# Patient Record
Sex: MALE | Employment: FULL TIME | URBAN - METROPOLITAN AREA
[De-identification: names, ages, dates, MRNs, and addresses within clinical notes are randomized per-mention and may not be internally consistent; named-entity substitution may affect disease eponyms.]

---

## 2018-09-02 ENCOUNTER — HOSPITAL ENCOUNTER (EMERGENCY)
Facility: HOSPITAL | Age: 38
Discharge: HOME | End: 2018-09-02
Attending: EMERGENCY MEDICINE
Payer: COMMERCIAL

## 2018-09-02 VITALS
HEART RATE: 85 BPM | WEIGHT: 200 LBS | BODY MASS INDEX: 33.32 KG/M2 | DIASTOLIC BLOOD PRESSURE: 61 MMHG | SYSTOLIC BLOOD PRESSURE: 111 MMHG | OXYGEN SATURATION: 99 % | HEIGHT: 65 IN | TEMPERATURE: 98 F | RESPIRATION RATE: 20 BRPM

## 2018-09-02 DIAGNOSIS — R51.9 HEADACHE: Primary | ICD-10-CM

## 2018-09-02 DIAGNOSIS — R42 LIGHTHEADED: ICD-10-CM

## 2018-09-02 LAB
ANION GAP SERPL CALCULATED.3IONS-SCNC: 15 MMOL/L (ref 4–13)
BASOPHILS # BLD AUTO: 0.1 THOUSANDS/ΜL (ref 0–0.1)
BASOPHILS NFR BLD AUTO: 1 % (ref 0–2)
BUN SERPL-MCNC: 16 MG/DL (ref 7–25)
CALCIUM SERPL-MCNC: 9.8 MG/DL (ref 8.6–10.5)
CHLORIDE SERPL-SCNC: 104 MMOL/L (ref 98–107)
CO2 SERPL-SCNC: 19 MMOL/L (ref 21–31)
CREAT SERPL-MCNC: 0.76 MG/DL (ref 0.7–1.3)
EOSINOPHIL # BLD AUTO: 0 THOUSAND/ΜL (ref 0–0.61)
EOSINOPHIL NFR BLD AUTO: 0 % (ref 0–5)
ERYTHROCYTE [DISTWIDTH] IN BLOOD BY AUTOMATED COUNT: 13.3 % (ref 11.5–14.5)
GFR SERPL CREATININE-BSD FRML MDRD: 117 ML/MIN/1.73SQ M
GLUCOSE SERPL-MCNC: 120 MG/DL (ref 65–99)
HCT VFR BLD AUTO: 41.4 % (ref 36.5–49.3)
HGB BLD-MCNC: 14.2 G/DL (ref 14–18)
LYMPHOCYTES # BLD AUTO: 1.8 THOUSANDS/ΜL (ref 0.6–4.47)
LYMPHOCYTES NFR BLD AUTO: 12 % (ref 21–51)
MCH RBC QN AUTO: 30.8 PG (ref 26–34)
MCHC RBC AUTO-ENTMCNC: 34.3 G/DL (ref 31–37)
MCV RBC AUTO: 90 FL (ref 81–99)
MONOCYTES # BLD AUTO: 0.5 THOUSAND/ΜL (ref 0.17–1.22)
MONOCYTES NFR BLD AUTO: 3 % (ref 2–12)
NEUTROPHILS # BLD AUTO: 12.4 THOUSANDS/ΜL (ref 1.4–6.5)
NEUTS SEG NFR BLD AUTO: 84 % (ref 42–75)
NRBC BLD AUTO-RTO: 0 /100 WBCS
PLATELET # BLD AUTO: 314 THOUSANDS/UL (ref 149–390)
PMV BLD AUTO: 8 FL (ref 8.6–11.7)
POTASSIUM SERPL-SCNC: 3.7 MMOL/L (ref 3.5–5.5)
RBC # BLD AUTO: 4.61 MILLION/UL (ref 4.3–5.9)
SODIUM SERPL-SCNC: 138 MMOL/L (ref 134–143)
WBC # BLD AUTO: 14.8 THOUSAND/UL (ref 4.8–10.8)

## 2018-09-02 PROCEDURE — 93005 ELECTROCARDIOGRAM TRACING: CPT

## 2018-09-02 PROCEDURE — 85025 COMPLETE CBC W/AUTO DIFF WBC: CPT | Performed by: EMERGENCY MEDICINE

## 2018-09-02 PROCEDURE — 80048 BASIC METABOLIC PNL TOTAL CA: CPT | Performed by: EMERGENCY MEDICINE

## 2018-09-02 PROCEDURE — 36415 COLL VENOUS BLD VENIPUNCTURE: CPT | Performed by: EMERGENCY MEDICINE

## 2018-09-02 PROCEDURE — 99284 EMERGENCY DEPT VISIT MOD MDM: CPT

## 2018-09-02 RX ORDER — BUTALBITAL, ACETAMINOPHEN AND CAFFEINE 50; 325; 40 MG/1; MG/1; MG/1
2 TABLET ORAL ONCE
Qty: 30 TABLET | Refills: 0 | Status: SHIPPED | OUTPATIENT
Start: 2018-09-02 | End: 2018-09-02

## 2018-09-02 RX ORDER — ONDANSETRON 4 MG/1
4 TABLET, ORALLY DISINTEGRATING ORAL ONCE
Qty: 20 TABLET | Refills: 0 | Status: SHIPPED | OUTPATIENT
Start: 2018-09-02 | End: 2018-09-02

## 2018-09-02 RX ORDER — BUTALBITAL, ACETAMINOPHEN AND CAFFEINE 50; 325; 40 MG/1; MG/1; MG/1
2 TABLET ORAL ONCE
Status: COMPLETED | OUTPATIENT
Start: 2018-09-02 | End: 2018-09-02

## 2018-09-02 RX ORDER — METOCLOPRAMIDE 5 MG/1
10 TABLET ORAL ONCE
Status: DISCONTINUED | OUTPATIENT
Start: 2018-09-02 | End: 2018-09-03 | Stop reason: HOSPADM

## 2018-09-02 RX ORDER — ONDANSETRON 4 MG/1
4 TABLET, ORALLY DISINTEGRATING ORAL ONCE
Status: COMPLETED | OUTPATIENT
Start: 2018-09-02 | End: 2018-09-02

## 2018-09-02 RX ORDER — KETOROLAC TROMETHAMINE 30 MG/ML
30 INJECTION, SOLUTION INTRAMUSCULAR; INTRAVENOUS ONCE
Status: DISCONTINUED | OUTPATIENT
Start: 2018-09-02 | End: 2018-09-02

## 2018-09-02 RX ORDER — MULTIVITAMIN
1 TABLET ORAL DAILY
COMMUNITY

## 2018-09-02 RX ADMIN — BUTALBITAL, ACETAMINOPHEN AND CAFFEINE 2 TABLET: 50; 325; 40 TABLET ORAL at 19:17

## 2018-09-02 RX ADMIN — ONDANSETRON 4 MG: 4 TABLET, ORALLY DISINTEGRATING ORAL at 18:55

## 2018-09-02 NOTE — ED PROVIDER NOTES
History  Chief Complaint   Patient presents with    Headache     Pt was slid down water slide and when he got off the slide he developed a HA and N/V     49-year-old male with a reported history of thyroid disease presents to the emergency department with a generalized headache over the past 5 hours  According to the patient he was water sliding  Following completion of the slide, patient developed nausea vomiting x4 and a generalized headache  He has had no loss of consciousness are no visual deficits  He has had headaches before although not this intense  He describes no visual deficits no rhinorrhea, no cough, sore throat, or runny nose  The patient reports adequate sleeping habits no appetite disturbances in his daily activities or consume by his job as a   He reports no changes bowel or bladder habits  Prior to Admission Medications   Prescriptions Last Dose Informant Patient Reported? Taking? Multiple Vitamin (MULTIVITAMIN) tablet   Yes Yes   Sig: Take 1 tablet by mouth daily      Facility-Administered Medications: None       History reviewed  No pertinent past medical history  History reviewed  No pertinent surgical history  History reviewed  No pertinent family history  I have reviewed and agree with the history as documented  Social History   Substance Use Topics    Smoking status: Never Smoker    Smokeless tobacco: Never Used    Alcohol use No        Review of Systems   Constitutional: Negative for chills and fever  HENT: Negative for rhinorrhea and sore throat  Eyes: Negative for visual disturbance  Respiratory: Negative for cough and shortness of breath  Cardiovascular: Negative for chest pain and leg swelling  Gastrointestinal: Negative for abdominal pain, diarrhea, nausea and vomiting  Genitourinary: Negative for dysuria  Musculoskeletal: Negative for back pain and myalgias  Skin: Negative for rash     Neurological: Negative for dizziness, tremors, seizures, speech difficulty, light-headedness and headaches  Psychiatric/Behavioral: Negative for confusion  All other systems reviewed and are negative  Physical Exam  Physical Exam   Constitutional: He is oriented to person, place, and time  He appears well-developed and well-nourished  He appears distressed (ANXIOUS)  HENT:   Nose: Nose normal    Mouth/Throat: Oropharynx is clear and moist  No oropharyngeal exudate  Eyes: Conjunctivae and EOM are normal  Pupils are equal, round, and reactive to light  No scleral icterus  Neck: Normal range of motion  Neck supple  No JVD present  No tracheal deviation present  Cardiovascular: Normal rate, regular rhythm and normal heart sounds  No murmur heard  Pulmonary/Chest: Effort normal and breath sounds normal  No respiratory distress  He has no wheezes  He has no rales  Abdominal: Soft  Bowel sounds are normal  There is no tenderness  There is no guarding  Musculoskeletal: Normal range of motion  He exhibits no edema or tenderness  Neurological: He is alert and oriented to person, place, and time  No cranial nerve deficit or sensory deficit  He exhibits normal muscle tone  5/5 motor, nl sens   Skin: Skin is warm and dry  Psychiatric: He has a normal mood and affect  His behavior is normal    Nursing note and vitals reviewed        Vital Signs  ED Triage Vitals [09/02/18 1857]   Temperature Pulse Respirations Blood Pressure SpO2   98 °F (36 7 °C) 90 20 166/62 100 %      Temp Source Heart Rate Source Patient Position - Orthostatic VS BP Location FiO2 (%)   Temporal Monitor -- Left arm --      Pain Score       4           Vitals:    09/02/18 1857   BP: 166/62   Pulse: 90       Visual Acuity      ED Medications  Medications - No data to display    Diagnostic Studies  Results Reviewed     None                 No orders to display              Procedures  Procedures       Phone Contacts  ED Phone Contact    ED Course MDM  Number of Diagnoses or Management Options  Headache:   Diagnosis management comments: AT 8:00 P M  THE PATIENT'S HEADACHE HAD RESOLVED  HE REMAINED SOMEWHAT NAUSEOUS  HOWEVER HE IS HEMODYNAMICALLY AND CLINICALLY STABLE AND APPROPRIATE FOR DISCHARGE  UPON DISCHARGE AT 8:30 A  M  THE PATIENT COMPLAINED THAT HE WAS TOO WEAK AND COULD NOT WALK AND THEREFORE COULD NOT BE DISCHARGED  PATIENT APPEARS ANXIOUS HYPERVENTILATING AND UNCLEAR AS WHAT MAY BE CONTRIBUTING TO HIS PRESENT CONDITION  PATIENT WAS GAIT TESTED AND DEMONSTRATES NO PATHOLOGICAL GAIT  NEVER THE LESS PATIENT IS OFFERED LABORATORY STUDIES TO INCLUDE CBC AND A BASIC METABOLIC PANEL  AN EKG RETURNED UNREMARKABLE  AT 10:15 P M  FIFTEEN HOURS THE PATIENT'S STUDIES RETURNED ESSENTIALLY UNREMARKABLE WITH SLIGHT ELEVATION OF WHITE COUNT AT 14 8  OVERALL NO EVIDENCE OF ANY ACUTE PATHOLOGY THAT WOULD SUGGEST ADMISSION PATIENT HAS BEEN ENCOURAGED TO FOLLOW THIS UP WITH THE FAMILY DOCTOR PARTICULARLY IN LIGHT OF SOME HISTORY IN THE FAMILY IN WHICH THE PATIENT HAS ALLUDED TO AS SOMEWHAT SIMILAR TO THE WAY THAT HE FEELS NOW  HOWEVER THE PATIENT CANNOT ELABORATE NOR IS HE AWARE OF WHAT PARTICULAR FLEXION THERE OR IN THE FAMILY  WHENEVER THE CONDITION FAMILY MEMBERS HAVE NOT BEEN HOSPITALIZED NOR HAVE THERE BEEN ANY APPARENT DETERIORATION IN THERE CONDITIONS  CritCare Time    Disposition  Final diagnoses:   None     ED Disposition     None      Follow-up Information    None         Patient's Medications   Discharge Prescriptions    No medications on file     No discharge procedures on file      ED Provider  Electronically Signed by           Shaun Daniel MD  09/02/18 5182       Shaun Daniel MD  09/02/18 2450

## 2018-09-03 LAB
ATRIAL RATE: 75 BPM
P AXIS: 43 DEGREES
PR INTERVAL: 146 MS
QRS AXIS: -5 DEGREES
QRSD INTERVAL: 86 MS
QT INTERVAL: 410 MS
QTC INTERVAL: 457 MS
T WAVE AXIS: 22 DEGREES
VENTRICULAR RATE: 75 BPM

## 2018-09-03 NOTE — ED NOTES
Pt refusing to be d/c  Pt states "He's discharging me? How?  I can't walk " When asked why pt is unable to walk, pt states "I feel to weak " Dr Guzman Cisneros notified     Riley Davis RN  09/02/18 2050

## 2018-09-03 NOTE — DISCHARGE INSTRUCTIONS
????   ??????   ???? ????????????????????????????????????????????????????????????????????????????????????????????????????  ??????????  · ????? ????????????????????????????????????????????????????????????????????????????????????????????????????????    · ??? ?????????????? 1 ? 3 ???????????????????????????????????????????????????????????????????????    · ?????? ??????????????????????????????????????????????????????    · ????? ???????????????????????????? 30 ??? 2 ?????????????? 1 ? 2 ??????????????????????  ???????????????  · ?????    · ?????????????    · ?????????????????????    · ??????????????????    · ???????????  ???????????????????  · ????????    · ???????????????????    · ?????????????????    · ????????????????  ?????  · ?? ??????????????????????????????????????????????????????????????????????NSAID ????? (acetaminophen) ?????????????    · ???? ?????????????????????????????????????????????????????????????    · ?????? ?????????????????????????????  ?????  · ????? ???????????????????????????????????????????????????????????????????? 2 ???? 20 ? 30 ?????????? 15 ? 20 ???????????????????????????    · ????? ?????????????????????????????????????    · ????? ???????????????????????????????????????? 24 ?????????????????????????????????????  ???????  · ?????????????? ????????????????????????????????????????????????????????????????????????    · ????? ?????????????????????????????????????????????????????????????????????????????????????????????    · ????????????? ?????????????????????????? ?? ???????????????????????????????????????    · ??????? ???????????????????? 30 ???????????????????????    · ????????? ??????????????????????????????????????????????????????????????????  ??????????????? ??????????????????????????????????????  © 2017 [facility] [facility] Information is for End User's use only and may not be sold, redistributed or otherwise used for commercial purposes   All illustrations and images included in CareNotes® are the copyrighted property of [employer]  or [employer]  The above information is an  only  It is not intended as medical advice for individual conditions or treatments  Talk to your doctor, nurse or pharmacist before following any medical regimen to see if it is safe and effective for you  Acute Headache, Ambulatory Care   GENERAL INFORMATION:   An acute headache  is pain or discomfort that starts suddenly and gets worse quickly  The cause of an acute headache may not be known  It may be triggered by stress, fatigue, hormones, food, or trauma  Common related symptoms include the following:   · Fever    · Sinus pressure    · Loss of memory    · Nausea or vomiting    · Problems with your vision, such as watery or red eyes, loss of vision, or pain in bright light    · Stiff neck    · Tenderness of the head and neck area    · Trouble staying awake, or being less alert than usual     · Weakness or less energy  Seek immediate care for the following symptoms:   · Severe pain    · A headache that occurs after a blow to the head, a fall, or other trauma     · Confusion or forgetfulness    · Numbness on one side of your face or body  Treatment for an acute headache  may include medicine to decrease pain  You may also need biofeedback or cognitive behavioral therapy  Ask your healthcare provider about these and other treatments for an acute headache  Manage my symptoms:   · Apply heat  on your head for 20 to 30 minutes every 2 hours for as many days as directed  Heat helps decrease pain and muscle spasms  You may alternate heat and ice  · Apply ice  on your head for 15 to 20 minutes every hour or as directed  Use an ice pack, or put crushed ice in a plastic bag  Cover it with a towel  Ice helps decrease pain  · Relax your muscles  Lie down in a comfortable position and close your eyes  Relax your muscles slowly  Start at your toes and work your way up your body      · Keep a record of your headaches  Write down when your headaches start and stop  Include your symptoms and what you were doing when the headache began  Record what you ate or drank for 24 hours before the headache started  Describe the pain and where it hurts  Keep track of what you did to treat your headache and whether it worked  Follow up with your healthcare provider as directed:  Bring your headache record with you when you see your healthcare provider  Write down your questions so you remember to ask them during your visits  CARE AGREEMENT:   You have the right to help plan your care  Learn about your health condition and how it may be treated  Discuss treatment options with your caregivers to decide what care you want to receive  You always have the right to refuse treatment  The above information is an  only  It is not intended as medical advice for individual conditions or treatments  Talk to your doctor, nurse or pharmacist before following any medical regimen to see if it is safe and effective for you  © 2014 [address] is for End User's use only and may not be sold, redistributed or otherwise used for commercial purposes  All illustrations and images included in CareNotes® are the copyrighted property of A [employer] M [employer]  or [employer]

## 2018-09-03 NOTE — ED NOTES
Pt ambulated to bathroom  Tolerated well  Pt induced vomiting by placing finger down throat several times "to feel better " Small amount of fluid came up  Pt states he "feels better " Dr Megan Gutierrez aware       Billy Jhaveri RN  09/02/18 6805

## 2018-09-03 NOTE — ED PROCEDURE NOTE
PROCEDURE  ECG 12 Lead Documentation  Date/Time: 9/2/2018 9:34 PM  Performed by: Mayra Olivas  Authorized by: Maira BEE     Indications / Diagnosis:  WEAKNESS  ECG reviewed by me, the ED Provider: yes    Patient location:  ED  Previous ECG:     Previous ECG:  Unavailable    Comparison to cardiac monitor: No    Interpretation:     Interpretation: normal    Rate:     ECG rate assessment: normal    Rhythm:     Rhythm: sinus rhythm    Ectopy:     Ectopy: none    QRS:     QRS axis:  Normal    QRS intervals:  Normal  Conduction:     Conduction: normal    ST segments:     ST segments:  Normal  T waves:     T waves: normal           Shaun Daniel MD  09/02/18 2139